# Patient Record
Sex: FEMALE | Race: WHITE | NOT HISPANIC OR LATINO | Employment: FULL TIME | URBAN - METROPOLITAN AREA
[De-identification: names, ages, dates, MRNs, and addresses within clinical notes are randomized per-mention and may not be internally consistent; named-entity substitution may affect disease eponyms.]

---

## 2021-07-26 ENCOUNTER — TELEPHONE (OUTPATIENT)
Dept: INTERNAL MEDICINE | Facility: CLINIC | Age: 61
End: 2021-07-26

## 2021-07-26 NOTE — TELEPHONE ENCOUNTER
Caller: Merari Guadarrama    Relationship: Self    Best call back number: [phone]    What orders are you requesting (i.e. lab or imaging): MRI ORDERS    In what timeframe would the patient need to come in: ASAP    Where will you receive your lab/imaging services: [facility] [place]    Additional notes: PATIENT CAN GET INTO [facility] [place] FOR MRI'S NEXT WEEK INSTEAD OF WAITING FOR 08/16/2021. PLEASE SEND ORDERS TO [facility] [place]. PATIENT WOULD ALSO LIKE TO GET THE CERVICAL MRI AS WELL BECAUSE OF ALL THE NUMBNESS AND TINGLING IN HER RIGHT ARM AND NECK SHE IS EXPERIENCING. SHE IS GOING FOR HER BACK ANYWAY. PLEASE ADVISE AND CALL PATIENT WHEN ORDERS HAVE BEEN SENT.

## 2021-09-02 PROBLEM — S12.000A C1 CERVICAL FRACTURE: Status: ACTIVE | Noted: 2021-09-02

## 2021-09-02 PROBLEM — Z86.79 HISTORY OF ATRIAL FIBRILLATION: Status: ACTIVE | Noted: 2021-09-02

## 2022-07-27 PROBLEM — Z78.9 GOOD TOLERANCE FOR ACTIVITY: Status: ACTIVE | Noted: 2021-06-04

## 2023-03-14 ENCOUNTER — TELEPHONE (OUTPATIENT)
Dept: INTERNAL MEDICINE | Facility: CLINIC | Age: 63
End: 2023-03-14

## 2023-03-14 NOTE — TELEPHONE ENCOUNTER
Caller: Radha Guadarrama    Relationship to patient: Self    Best call back number: [phone]    Chief complaint: 4 MONTH FOLLOW UP FOR MEDICATION CHECK    Type of visit: OFFICE VISIT    Requested date: 3/21/23 EARLIER THAN 9:30 OR 12:00PM OR LATER    If rescheduling, when is the original appointment: 3/21/23      Additional notes:PATIENT STATES THAT SHE HAS ANOTHER APPOINTMENT ON 3/21/23 AT 10:30AM AND IS WANTING TO CHANGE THE TIME OF HER APPOINTMENT, IF POSSIBLE, SO THAT THERE IS NO RISK OF HER MISSING THAT APPOINTMENT.    NO APPOINTMENT SLOTS WERE AVAILABLE AT ALL UNTIL THE END OF April.    PLEASE ADVISE PATIENT IF ABLE TO WORK HER IN SOONER THAN 9:30AM OR 12:00PM OR LATER ON 3/21/23 OR ANY OTHER DAY THE WEEK OF MARCH 20TH-24TH

## 2023-03-15 NOTE — TELEPHONE ENCOUNTER
Please check if she can come in the week after - Tuesday the 28th at 8.15 am.    Thanks    If she is agreeable to the change, please block the original slot

## 2023-09-11 PROBLEM — I48.0 PAROXYSMAL ATRIAL FIBRILLATION: Status: RESOLVED | Noted: 2021-09-02 | Resolved: 2023-09-11

## 2023-09-11 PROBLEM — R07.2 PRECORDIAL PAIN: Status: ACTIVE | Noted: 2023-09-11
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
## 2023-09-15 ENCOUNTER — OFFICE VISIT (OUTPATIENT)
Dept: CARDIOLOGY | Facility: CLINIC | Age: 63
End: 2023-09-15
Payer: COMMERCIAL

## 2023-09-15 VITALS
DIASTOLIC BLOOD PRESSURE: 82 MMHG | OXYGEN SATURATION: 98 % | HEIGHT: 66 IN | WEIGHT: 141.6 LBS | HEART RATE: 49 BPM | SYSTOLIC BLOOD PRESSURE: 152 MMHG | BODY MASS INDEX: 22.76 KG/M2

## 2023-09-15 DIAGNOSIS — I48.0 PAROXYSMAL ATRIAL FIBRILLATION: ICD-10-CM

## 2023-09-15 DIAGNOSIS — I10 PRIMARY HYPERTENSION: ICD-10-CM

## 2023-09-15 DIAGNOSIS — R00.1 BRADYCARDIA, SINUS: ICD-10-CM

## 2023-09-15 DIAGNOSIS — E78.5 DYSLIPIDEMIA: ICD-10-CM

## 2023-09-15 DIAGNOSIS — R07.2 PRECORDIAL PAIN: Primary | ICD-10-CM

## 2023-09-15 DIAGNOSIS — R06.09 DYSPNEA ON EXERTION: ICD-10-CM

## 2023-09-15 RX ORDER — ROSUVASTATIN CALCIUM 10 MG/1
10 TABLET, COATED ORAL DAILY
Qty: 30 TABLET | Refills: 11 | Status: SHIPPED | OUTPATIENT
Start: 2023-09-15

## 2023-10-30 ENCOUNTER — TELEPHONE (OUTPATIENT)
Dept: CARDIOLOGY | Facility: CLINIC | Age: 63
End: 2023-10-30
Payer: COMMERCIAL

## 2023-10-30 NOTE — TELEPHONE ENCOUNTER
Called patient to let her know we will send orders to [facility] and they will call to schedule her tests.

## 2023-10-30 NOTE — TELEPHONE ENCOUNTER
"  Caller: Thierry Merari CIERRA Melgar \"Radha\"    Relationship: Self    Best call back number: [phone]    What is the best time to reach you: ANY    Who are you requesting to speak with (clinical staff, provider,  specific staff member): CLINICAL    What was the call regarding: PATIENT IS REQUESTING A REFERRAL BE SENT TO [facility] FOR HER ECHO AND NUCLEAR STRESS TEST PROCEDURE DUE TO INSURANCE. PLEASE ADVISE PATIENT WHEN THIS HAS BEEN SENT OVER.  ALSO, PLEASE CANCEL THE CURRENT SCHEDULED TESTS.    CONTACT [phone] FOR SCHEDULING THE ECHO AND NUCLEAR STRESS TEST    Is it okay if the provider responds through [facility]: PLEASE CALL WHEN SCHEDULING IS COMPLETE.        "

## 2023-11-17 DIAGNOSIS — S14.3XXA TRAUMATIC BRACHIAL PLEXOPATHY: ICD-10-CM

## 2023-11-17 RX ORDER — TRAMADOL HYDROCHLORIDE 50 MG/1
50 TABLET ORAL EVERY EVENING
Qty: 15 TABLET | Refills: 0 | Status: SHIPPED | OUTPATIENT
Start: 2023-11-17

## 2023-11-22 ENCOUNTER — PATIENT MESSAGE (OUTPATIENT)
Dept: INTERNAL MEDICINE | Facility: CLINIC | Age: 63
End: 2023-11-22
Payer: COMMERCIAL

## 2023-11-29 ENCOUNTER — OFFICE VISIT (OUTPATIENT)
Dept: INTERNAL MEDICINE | Facility: CLINIC | Age: 63
End: 2023-11-29
Payer: COMMERCIAL

## 2023-11-29 VITALS
DIASTOLIC BLOOD PRESSURE: 80 MMHG | OXYGEN SATURATION: 98 % | TEMPERATURE: 97.5 F | WEIGHT: 148.4 LBS | SYSTOLIC BLOOD PRESSURE: 110 MMHG | HEART RATE: 50 BPM | BODY MASS INDEX: 23.85 KG/M2 | HEIGHT: 66 IN

## 2023-11-29 DIAGNOSIS — Z12.31 OTHER SCREENING MAMMOGRAM: ICD-10-CM

## 2023-11-29 DIAGNOSIS — N39.0 URINARY TRACT INFECTION WITH HEMATURIA, SITE UNSPECIFIED: ICD-10-CM

## 2023-11-29 DIAGNOSIS — Z78.0 POSTMENOPAUSAL: ICD-10-CM

## 2023-11-29 DIAGNOSIS — E55.9 VITAMIN D DEFICIENCY: ICD-10-CM

## 2023-11-29 DIAGNOSIS — S14.3XXA TRAUMATIC BRACHIAL PLEXOPATHY: ICD-10-CM

## 2023-11-29 DIAGNOSIS — R31.9 URINARY TRACT INFECTION WITH HEMATURIA, SITE UNSPECIFIED: ICD-10-CM

## 2023-11-29 DIAGNOSIS — Z00.00 ROUTINE GENERAL MEDICAL EXAMINATION AT A HEALTH CARE FACILITY: Primary | ICD-10-CM

## 2023-11-29 DIAGNOSIS — Z86.73 HISTORY OF CVA (CEREBROVASCULAR ACCIDENT): ICD-10-CM

## 2023-11-29 DIAGNOSIS — Z83.3 FAMILY HISTORY OF DIABETES MELLITUS: ICD-10-CM

## 2023-11-29 LAB
BILIRUB BLD-MCNC: NEGATIVE MG/DL
CLARITY, POC: ABNORMAL
COLOR UR: YELLOW
EXPIRATION DATE: ABNORMAL
GLUCOSE UR STRIP-MCNC: NEGATIVE MG/DL
KETONES UR QL: NEGATIVE
LEUKOCYTE EST, POC: NEGATIVE
Lab: ABNORMAL
NITRITE UR-MCNC: POSITIVE MG/ML
PH UR: 7.5 [PH] (ref 5–8)
PROT UR STRIP-MCNC: NEGATIVE MG/DL
RBC # UR STRIP: ABNORMAL /UL
SP GR UR: 1.01 (ref 1–1.03)
UROBILINOGEN UR QL: NORMAL

## 2023-11-29 PROCEDURE — 87086 URINE CULTURE/COLONY COUNT: CPT | Performed by: INTERNAL MEDICINE

## 2023-11-29 PROCEDURE — 87186 SC STD MICRODIL/AGAR DIL: CPT | Performed by: INTERNAL MEDICINE

## 2023-11-29 PROCEDURE — 87088 URINE BACTERIA CULTURE: CPT | Performed by: INTERNAL MEDICINE

## 2023-11-29 RX ORDER — BACLOFEN 10 MG/1
5-20 TABLET ORAL NIGHTLY PRN
Qty: 60 TABLET | Refills: 5 | Status: SHIPPED | OUTPATIENT
Start: 2023-11-29

## 2023-11-29 RX ORDER — GABAPENTIN 100 MG/1
100 CAPSULE ORAL NIGHTLY
Qty: 30 CAPSULE | Refills: 3 | Status: SHIPPED | OUTPATIENT
Start: 2023-11-29

## 2023-11-29 RX ORDER — ASPIRIN 81 MG/1
81 TABLET ORAL DAILY
Qty: 90 TABLET | Refills: 3 | Status: SHIPPED | OUTPATIENT
Start: 2023-11-29

## 2023-11-29 NOTE — PROGRESS NOTES
Chief Complaint   Patient presents with    Annual Exam    Hyperlipidemia     Unable to take crestor       History of Present Illness  HM, Adult Female:   The patient is being seen for a health maintenance evaluation. The last health maintenance visit was 1 year(s) ago.   Social History: She is  with children. Work status: Respiratory therapist,at [employer].  She has never smoked. She reports socially drinking alcohol. Denies any illicit drug use.  General Health: The patient's health is described as good. She has regular dental visits. She denies vision problems, but plans on making an eye appointment.. She denies hearing loss. Immunizations status: up to date.   Lifestyle:. She consumes a diverse and healthy diet. She does not have any weight concerns. She exercises regularly. She denies tobacco. She denies alcohol use. She denies drug use.   Reproductive health:. She reports menopausal.   Screening: Cancer screening reviewed and current.   Metabolic screening reviewed and current.   Risk screening reviewed and current.   Sister was asymptomatic when she had 3-vessel disease       HPI  Notes she was seen by Dr. Alford, had n/l echo and stress test. Was started on crestor but was not able to take, as it caused severe muscle and joint pains.  Also has been seeing a PT, for her myofascial pain x 1 year, has been helping, seen once a month..Uses her HSA for the same.  Has weaned herself off the gabapentin.    Review of Systems   Constitutional:  Negative for chills and fatigue.   HENT:  Negative for congestion, ear pain and sore throat.    Eyes:  Negative for pain, redness and visual disturbance.   Respiratory:  Negative for cough and shortness of breath.    Cardiovascular:  Negative for chest pain, palpitations and leg swelling.   Gastrointestinal:  Negative for abdominal pain, diarrhea and nausea.   Endocrine: Negative for cold intolerance and heat intolerance.   Genitourinary:  Negative for flank pain and  urgency.   Musculoskeletal:  Positive for arthralgias and myalgias. Negative for gait problem.   Skin:  Negative for pallor and rash.   Neurological:  Negative for dizziness, weakness and headaches.   Psychiatric/Behavioral:  Negative for dysphoric mood and sleep disturbance. The patient is not nervous/anxious.        Patient Active Problem List   Diagnosis    Dyslipidemia    Acquired hypothyroidism    Postmenopausal HRT (hormone replacement therapy)    History of traumatic brain injury    C1 cervical fracture    SAH (subarachnoid hemorrhage)    History of CVA (cerebrovascular accident)    Varicose veins of right lower extremity    PTSD (post-traumatic stress disorder)    Presbyopia of both eyes    Multiple closed fractures of ribs    Lumbosacral radiculopathy    Idiopathic osteoarthritis    Primary hypertension    Good tolerance for activity    Fracture of scapula    Fibula fracture    Esotropia, alternating    Dysarthria    Dysphagia    Cataract, nuclear sclerotic, both eyes    Injury of vertebral artery    Brachial plexus injury    Adhesive capsulitis    Abducens nerve palsy    Brain injury    Brainstem stroke    Precordial pain    Bradycardia, sinus       Social History     Socioeconomic History    Marital status:    Tobacco Use    Smoking status: Never    Smokeless tobacco: Never   Vaping Use    Vaping Use: Never used   Substance and Sexual Activity    Alcohol use: Yes     Alcohol/week: 1.0 - 3.0 standard drink of alcohol     Types: 1 - 3 Glasses of wine per week     Comment: occasional    Drug use: Never    Sexual activity: Yes     Partners: Male     Birth control/protection: Post-menopausal       Current Outpatient Medications on File Prior to Visit   Medication Sig Dispense Refill    Calcium Carbonate (CALCIUM 600 PO) Take  by mouth.      Cholecalciferol (Vitamin D3) 50 MCG (2000 UT) capsule 4,000 capsules Daily.      estradiol micronized powder Biest 80/20- 0.25 mg/ 0.5 ml cream Apply 0.8 ml 4 lines  "twice daily on calendar days 1- 29. Titrate as indicated. 25 g 11    ibuprofen (ADVIL,MOTRIN) 600 MG tablet Take  by mouth Every 6 (Six) Hours As Needed.      levothyroxine (SYNTHROID, LEVOTHROID) 25 MCG tablet Take 1 tablet by mouth Daily. 90 tablet 3    Multiple Vitamins-Minerals (ICAPS AREDS 2 PO) Take  by mouth.      Multiple Vitamins-Minerals (MULTIVITAMIN WITH MINERALS) tablet tablet Take 1 tablet by mouth Daily.      Progesterone powder Progesterone 2.5 % Cream Apply 0.4 ml BID on calendar days 1- 29. Titrate as indicated. 25 g 11    traMADol (ULTRAM) 50 MG tablet Take 1 tablet by mouth Every Evening. 15 tablet 0    triamcinolone (KENALOG) 0.1 % cream Apply  topically to the appropriate area as directed 2 (Two) Times a Day. (Patient taking differently: Apply  topically to the appropriate area as directed As Needed.) 60 g 1     No current facility-administered medications on file prior to visit.       Allergies   Allergen Reactions    Other Hives     Seafood         /80   Pulse 50   Temp 97.5 °F (36.4 °C)   Ht 167.6 cm (66\")   Wt 67.3 kg (148 lb 6.4 oz)   SpO2 98% Comment: ra  BMI 23.95 kg/m²            The following portions of the patient's history were reviewed and updated as appropriate: allergies, current medications, past family history, past medical history, past social history, past surgical history, and problem list.    Physical Exam  Constitutional:       General: She is not in acute distress.     Appearance: Normal appearance.   HENT:      Head: Normocephalic and atraumatic.      Right Ear: Tympanic membrane and external ear normal.      Left Ear: Tympanic membrane and external ear normal.      Nose: Nose normal.      Mouth/Throat:      Mouth: Mucous membranes are moist.   Eyes:      General: No scleral icterus.  Neck:      Vascular: No carotid bruit.   Cardiovascular:      Rate and Rhythm: Normal rate and regular rhythm.      Pulses: Normal pulses.      Heart sounds: Normal heart " sounds. No murmur heard.     No friction rub. No gallop.   Pulmonary:      Effort: Pulmonary effort is normal.      Breath sounds: Normal breath sounds. No rhonchi or rales.   Abdominal:      General: Bowel sounds are normal. There is no distension.      Palpations: Abdomen is soft.      Tenderness: There is no right CVA tenderness, left CVA tenderness, guarding or rebound.      Hernia: No hernia is present.   Musculoskeletal:         General: No tenderness. Normal range of motion.      Cervical back: Normal range of motion.      Right lower leg: No edema.      Left lower leg: No edema.   Lymphadenopathy:      Cervical: No cervical adenopathy.   Skin:     General: Skin is warm.      Findings: No rash.   Neurological:      General: No focal deficit present.      Mental Status: She is alert and oriented to person, place, and time. Mental status is at baseline.      Cranial Nerves: No cranial nerve deficit.      Sensory: No sensory deficit.      Coordination: Coordination normal.      Gait: Gait normal.      Deep Tendon Reflexes: Reflexes normal.   Psychiatric:         Mood and Affect: Mood normal.         Behavior: Behavior normal.         BMI is within normal parameters. No other follow-up for BMI required.       Results for orders placed or performed in visit on 11/29/23   Urine Culture - Urine, Urine, Clean Catch    Specimen: Urine, Clean Catch   Result Value Ref Range    Urine Culture >100,000 CFU/mL Escherichia coli (A)    POCT urinalysis dipstick, automated    Specimen: Urine   Result Value Ref Range    Color Yellow Yellow, Straw, Dark Yellow, Emely    Clarity, UA Hazy (A) Clear    Specific Gravity  1.010 1.005 - 1.030    pH, Urine 7.5 5.0 - 8.0    Leukocytes Negative Negative    Nitrite, UA Positive (A) Negative    Protein, POC Negative Negative mg/dL    Glucose, UA Negative Negative mg/dL    Ketones, UA Negative Negative    Urobilinogen, UA Normal Normal, 0.2 E.U./dL    Bilirubin Negative Negative    Blood, UA  1+ (A) Negative    Lot Number [id number]     Expiration Date 1/14/25        Diagnoses and all orders for this visit:    1. Routine general medical examination at a health care facility (Primary)  -     POCT urinalysis dipstick, automated  -     CBC (No Diff); Future  -     Comprehensive Metabolic Panel; Future  -     Lipid Panel; Future  -     TSH Rfx On Abnormal To Free T4; Future  -     Vitamin B12; Future    2. Traumatic brachial plexopathy  -     baclofen (LIORESAL) 10 MG tablet; Take 0.5-2 tablets by mouth At Night As Needed for Muscle Spasms.  Dispense: 60 tablet; Refill: 5  -     gabapentin (NEURONTIN) 100 MG capsule; Take 1 capsule by mouth Every Night.  Dispense: 30 capsule; Refill: 3    3. Urinary tract infection with hematuria, site unspecified  -     Urine Culture - Urine, Urine, Clean Catch; Future  -     Urine Culture - Urine, Urine, Clean Catch    4. Vitamin D deficiency  -     Vitamin D,25-Hydroxy; Future    5. Family history of diabetes mellitus  -     Hemoglobin A1c; Future    6. Other screening mammogram  -     Mammo Screening Digital Tomosynthesis Bilateral With CAD; Future    7. History of CVA (cerebrovascular accident)  -     aspirin 81 MG EC tablet; Take 1 tablet by mouth Daily.  Dispense: 90 tablet; Refill: 3    8. Postmenopausal  -     DEXA Bone Density Axial; Future    Adv to start crestor once weekly, and gradually increase to qod if tolerated.    The patient has read and signed the [facility] Controlled Substance Contract.  I will continue to see patient for regular follow up appointments.  They are well controlled on their medication.  AYAKA has been reviewed by me and is updated every 3 months. The patient is aware of the potential for addiction and dependence.       Health Maintenance   Topic Date Due    ZOSTER VACCINE (1 of 2) Never done    COVID-19 Vaccine (4 - 2023-24 season) 12/01/2023 (Originally 9/1/2023)    TDAP/TD VACCINES (2 - Td or Tdap) 11/29/2024 (Originally  10/1/2023)    ANNUAL PHYSICAL  11/29/2024    MAMMOGRAM  12/15/2024    PAP SMEAR  06/07/2026    COLORECTAL CANCER SCREENING  01/14/2031    HEPATITIS C SCREENING  Completed    INFLUENZA VACCINE  Completed    Pneumococcal Vaccine 0-64  Aged Out       Discussion/Summary  Impression: health maintenance visit. Currently, she eats an adequate diet and has an adequate exercise regimen.   Cervical cancer screening:Pap smear is current.   Breast cancer screening: mammogram is current.   Colorectal cancer screening: colonoscopy is current.  Osteoporosis screening: Bone mineral density test is due- ordered.   CT low dose screen - not indicated  Screening lab work includes hemoglobin, glucose, lipid profile, thyroid function testing, 25-hydroxyvitamin D and urinalysis.   Immunizations are needed, immunizations will be given as outlined in the orders   Advice and education were given regarding cardiovascular risk reduction, healthy dietary habits, Seatbelt and helmet use and self skin examination.     Return in about 6 months (around 5/29/2024) for Next scheduled follow up.      Electronically signed by:    Katherine Dinero MD

## 2023-12-01 LAB — BACTERIA SPEC AEROBE CULT: ABNORMAL

## 2023-12-05 RX ORDER — NITROFURANTOIN 25; 75 MG/1; MG/1
100 CAPSULE ORAL 2 TIMES DAILY
Qty: 14 CAPSULE | Refills: 0 | Status: SHIPPED | OUTPATIENT
Start: 2023-12-05

## 2023-12-22 ENCOUNTER — TELEPHONE (OUTPATIENT)
Dept: INTERNAL MEDICINE | Facility: CLINIC | Age: 63
End: 2023-12-22
Payer: COMMERCIAL

## 2023-12-22 ENCOUNTER — PATIENT MESSAGE (OUTPATIENT)
Dept: INTERNAL MEDICINE | Facility: CLINIC | Age: 63
End: 2023-12-22
Payer: COMMERCIAL

## 2023-12-22 DIAGNOSIS — Z79.890 POSTMENOPAUSAL HRT (HORMONE REPLACEMENT THERAPY): ICD-10-CM

## 2023-12-22 NOTE — TELEPHONE ENCOUNTER
Pharmacy Name:  [facility]    Reference Number (if applicable): [id number]    Pharmacy representative name: GENE    Pharmacy representative phone number: [phone]    What medication are you calling in regards to: Progesterone powder AND      estradiol micronized powder   11 ordered                   What question does the pharmacy have: PATIENT STATES THIS WAS ORDERED THE END OF NOVEMBER    Who is the provider that prescribed the medication: DR EUCEDA    Additional notes:

## 2023-12-26 DIAGNOSIS — Z79.890 POSTMENOPAUSAL HRT (HORMONE REPLACEMENT THERAPY): ICD-10-CM

## 2023-12-26 RX ORDER — PROGESTERONE 100 %
POWDER (GRAM) MISCELLANEOUS
Qty: 25 G | Refills: 11 | Status: SHIPPED | OUTPATIENT
Start: 2023-12-26

## 2023-12-26 RX ORDER — ESTRADIOL MICRONIZED 100 %
POWDER (GRAM) MISCELLANEOUS
Qty: 25 G | Refills: 11 | Status: SHIPPED | OUTPATIENT
Start: 2023-12-26

## 2023-12-26 NOTE — TELEPHONE ENCOUNTER
From: Merari Guadarrama  To: Katherine Dinero  Sent: 12/22/2023 12:54 PM EST  Subject: Horomone creams    [facility]. He said he never got the renewal from my physical. I think you sent my updates to [facility] for reg meds but these compounded creams come from [facility].

## 2023-12-26 NOTE — TELEPHONE ENCOUNTER
"Caller: Merari Guadarrama Talia \"Radha\"    Relationship: Self    Best call back number: [phone]     Requested Prescriptions:   Requested Prescriptions     Pending Prescriptions Disp Refills    estradiol micronized powder 25 g 11     Sig: Biest 80/20- 0.25 mg/ 0.5 ml cream Apply 0.8 ml 4 lines twice daily on calendar days 1- 29. Titrate as indicated.        Pharmacy where request should be sent: [facility] [address] - [phone] [facility] [phone] FX     Last office visit with prescribing clinician: 11/29/2023   Last telemedicine visit with prescribing clinician: Visit date not found   Next office visit with prescribing clinician: 12/6/2024     Additional details provided by patient: PATIENT HAS CALLED REQUESTING A NEW PRESCRIPTION ON ABOVE MEDICATION.    Does the patient have less than a 3 day supply:  [x] Yes  [] No    Would you like a call back once the refill request has been completed: [] Yes [x] No    If the office needs to give you a call back, can they leave a voicemail: [] Yes [x] No    Jorge A Gómez   12/26/23 10:31 EST         "

## 2023-12-26 NOTE — TELEPHONE ENCOUNTER
Rx Refill Note  Requested Prescriptions     Signed Prescriptions Disp Refills    Progesterone powder 25 g 11     Sig: Progesterone 2.5 % Cream Apply 0.4 ml BID on calendar days 1- 29. Titrate as indicated.     Authorizing Provider: ARIEL EUCEDA     Ordering User: FREDO ROSE      Last office visit with prescribing clinician: 11/29/2023   Last telemedicine visit with prescribing clinician: Visit date not found   Next office visit with prescribing clinician: 12/6/2024       Fredo Rose MA  12/26/23, 08:11 EST

## 2024-01-24 ENCOUNTER — HOSPITAL ENCOUNTER (OUTPATIENT)
Dept: MAMMOGRAPHY | Facility: HOSPITAL | Age: 64
Discharge: HOME | End: 2024-01-24
Admitting: INTERNAL MEDICINE
Payer: COMMERCIAL

## 2024-01-24 DIAGNOSIS — Z12.31 OTHER SCREENING MAMMOGRAM: ICD-10-CM

## 2024-01-24 PROCEDURE — 77067 SCR MAMMO BI INCL CAD: CPT

## 2024-01-24 PROCEDURE — 77063 BREAST TOMOSYNTHESIS BI: CPT

## 2024-05-29 DIAGNOSIS — E03.8 OTHER SPECIFIED HYPOTHYROIDISM: ICD-10-CM

## 2024-05-29 RX ORDER — LEVOTHYROXINE SODIUM 0.03 MG/1
25 TABLET ORAL DAILY
Qty: 90 TABLET | Refills: 1 | Status: SHIPPED | OUTPATIENT
Start: 2024-05-29

## 2024-12-06 ENCOUNTER — OFFICE VISIT (OUTPATIENT)
Dept: INTERNAL MEDICINE | Facility: CLINIC | Age: 64
End: 2024-12-06
Payer: COMMERCIAL

## 2024-12-06 VITALS
OXYGEN SATURATION: 97 % | BODY MASS INDEX: 24.2 KG/M2 | DIASTOLIC BLOOD PRESSURE: 76 MMHG | WEIGHT: 150.6 LBS | SYSTOLIC BLOOD PRESSURE: 118 MMHG | TEMPERATURE: 97.6 F | HEIGHT: 66 IN | HEART RATE: 54 BPM

## 2024-12-06 DIAGNOSIS — M89.8X1 CHRONIC SCAPULAR PAIN: ICD-10-CM

## 2024-12-06 DIAGNOSIS — E78.5 DYSLIPIDEMIA: ICD-10-CM

## 2024-12-06 DIAGNOSIS — Z00.00 ROUTINE GENERAL MEDICAL EXAMINATION AT A HEALTH CARE FACILITY: Primary | ICD-10-CM

## 2024-12-06 DIAGNOSIS — Z79.890 POSTMENOPAUSAL HRT (HORMONE REPLACEMENT THERAPY): ICD-10-CM

## 2024-12-06 DIAGNOSIS — M54.9 MID BACK PAIN ON RIGHT SIDE: ICD-10-CM

## 2024-12-06 DIAGNOSIS — E55.9 VITAMIN D DEFICIENCY: ICD-10-CM

## 2024-12-06 DIAGNOSIS — G89.29 CHRONIC SCAPULAR PAIN: ICD-10-CM

## 2024-12-06 DIAGNOSIS — E03.8 OTHER SPECIFIED HYPOTHYROIDISM: ICD-10-CM

## 2024-12-06 DIAGNOSIS — S42.101S CLOSED FRACTURE OF RIGHT SCAPULA, UNSPECIFIED PART OF SCAPULA, SEQUELA: ICD-10-CM

## 2024-12-06 DIAGNOSIS — S14.3XXS INJURY OF BRACHIAL PLEXUS, SEQUELA: ICD-10-CM

## 2024-12-06 LAB
BILIRUB BLD-MCNC: NEGATIVE MG/DL
CLARITY, POC: CLEAR
COLOR UR: YELLOW
EXPIRATION DATE: ABNORMAL
GLUCOSE UR STRIP-MCNC: NEGATIVE MG/DL
KETONES UR QL: NEGATIVE
LEUKOCYTE EST, POC: NEGATIVE
Lab: ABNORMAL
NITRITE UR-MCNC: NEGATIVE MG/ML
PH UR: 6 [PH] (ref 5–8)
PROT UR STRIP-MCNC: NEGATIVE MG/DL
RBC # UR STRIP: ABNORMAL /UL
SP GR UR: 1.01 (ref 1–1.03)
UROBILINOGEN UR QL: NORMAL

## 2024-12-06 RX ORDER — LEVOTHYROXINE SODIUM 25 UG/1
25 TABLET ORAL DAILY
Qty: 90 TABLET | Refills: 1 | Status: SHIPPED | OUTPATIENT
Start: 2024-12-06

## 2024-12-06 RX ORDER — PROGESTERONE 100 %
POWDER (GRAM) MISCELLANEOUS
Qty: 25 G | Refills: 11 | Status: SHIPPED | OUTPATIENT
Start: 2024-12-06

## 2024-12-06 RX ORDER — ESTRADIOL MICRONIZED 100 %
POWDER (GRAM) MISCELLANEOUS
Qty: 25 G | Refills: 11 | Status: SHIPPED | OUTPATIENT
Start: 2024-12-06

## 2024-12-06 NOTE — PROGRESS NOTES
Chief Complaint   Patient presents with    Annual Exam       History of Present Illness  HM, Adult Female: The patient is being seen for a health maintenance evaluation. The last health maintenance visit was 1 year(s) ago.   Social History: She is  with children. Work status: Respiratory therapist,at [employer].  She has never smoked. She reports socially drinking alcohol. Denies any illicit drug use.  General Health: The patient's health is described as good. She has regular dental visits. She denies vision problems, but plans on making an eye appointment.. She denies hearing loss. Immunizations status: up to date.   Lifestyle:. She consumes a diverse and healthy diet. She does not have any weight concerns. She exercises regularly. She denies tobacco. She denies alcohol use. She denies drug use.   Reproductive health:. She reports menopausal.   Screening: Cancer screening reviewed and current.   Metabolic screening reviewed and current.   Risk screening reviewed and current.   Sister was asymptomatic when she had 3-vessel disease .     HPI  Going for PT and massage, taking tylenol, having some intercostal nerve . Dtr is a PA with CT surgery.     Review of Systems   Constitutional:  Negative for chills and fatigue.   HENT:  Negative for congestion, ear pain and sore throat.    Eyes:  Negative for pain, redness and visual disturbance.   Respiratory:  Negative for cough and shortness of breath.    Cardiovascular:  Positive for chest pain. Negative for palpitations and leg swelling.   Gastrointestinal:  Negative for abdominal pain, diarrhea and nausea.   Endocrine: Negative for cold intolerance and heat intolerance.   Genitourinary:  Negative for flank pain and urgency.   Musculoskeletal:  Positive for arthralgias, back pain, joint swelling and myalgias. Negative for gait problem.   Skin:  Negative for pallor and rash.   Neurological:  Positive for weakness and numbness. Negative for dizziness and headaches.    Psychiatric/Behavioral:  Negative for dysphoric mood and sleep disturbance. The patient is not nervous/anxious.        Patient Active Problem List   Diagnosis    Dyslipidemia    Acquired hypothyroidism    Postmenopausal HRT (hormone replacement therapy)    History of traumatic brain injury    C1 cervical fracture    SAH (subarachnoid hemorrhage)    History of CVA (cerebrovascular accident)    Varicose veins of right lower extremity    PTSD (post-traumatic stress disorder)    Presbyopia of both eyes    Multiple closed fractures of ribs    Lumbosacral radiculopathy    Idiopathic osteoarthritis    Primary hypertension    Good tolerance for activity    Fracture of scapula    Fibula fracture    Esotropia, alternating    Dysarthria    Dysphagia    Cataract, nuclear sclerotic, both eyes    Injury of vertebral artery    Brachial plexus injury    Adhesive capsulitis    Abducens nerve palsy    Brain injury    Brainstem stroke    Precordial pain    Bradycardia, sinus       Social History     Socioeconomic History    Marital status:    Tobacco Use    Smoking status: Never    Smokeless tobacco: Never   Vaping Use    Vaping status: Never Used   Substance and Sexual Activity    Alcohol use: Yes     Alcohol/week: 1.0 - 3.0 standard drink of alcohol     Types: 1 - 3 Glasses of wine per week     Comment: occasional    Drug use: Never    Sexual activity: Yes     Partners: Male     Birth control/protection: Post-menopausal       Current Outpatient Medications on File Prior to Visit   Medication Sig Dispense Refill    aspirin 81 MG EC tablet Take 1 tablet by mouth Daily. 90 tablet 3    B Complex Vitamins (VITAMIN B COMPLEX PO) Take  by mouth.      Calcium Carbonate (CALCIUM 600 PO) Take  by mouth.      Cholecalciferol (Vitamin D3) 50 MCG (2000 UT) capsule 4,000 capsules Daily.      ibuprofen (ADVIL,MOTRIN) 600 MG tablet Take  by mouth Every 6 (Six) Hours As Needed.      Multiple Vitamins-Minerals (ICAPS AREDS 2 PO) Take  by  "mouth.      Multiple Vitamins-Minerals (MULTIVITAMIN WITH MINERALS) tablet tablet Take 1 tablet by mouth Daily.      triamcinolone (KENALOG) 0.1 % cream Apply  topically to the appropriate area as directed 2 (Two) Times a Day. (Patient taking differently: Apply  topically to the appropriate area as directed As Needed.) 60 g 1    [DISCONTINUED] estradiol micronized powder Biest 80/20- 0.25 mg/ 0.5 ml cream Apply 0.8 ml 4 lines twice daily on calendar days 1- 29. Titrate as indicated. 25 g 11    [DISCONTINUED] levothyroxine (SYNTHROID, LEVOTHROID) 25 MCG tablet TAKE 1 TABLET BY MOUTH DAILY 90 tablet 1    [DISCONTINUED] Progesterone powder Progesterone 2.5 % Cream Apply 0.4 ml BID on calendar days 1- 29. Titrate as indicated. 25 g 11    [DISCONTINUED] baclofen (LIORESAL) 10 MG tablet Take 0.5-2 tablets by mouth At Night As Needed for Muscle Spasms. 60 tablet 5    [DISCONTINUED] gabapentin (NEURONTIN) 100 MG capsule Take 1 capsule by mouth Every Night. 30 capsule 3    [DISCONTINUED] nitrofurantoin, macrocrystal-monohydrate, (Macrobid) 100 MG capsule Take 1 capsule by mouth 2 (Two) Times a Day. 14 capsule 0    [DISCONTINUED] traMADol (ULTRAM) 50 MG tablet Take 1 tablet by mouth Every Evening. 15 tablet 0     No current facility-administered medications on file prior to visit.       Allergies   Allergen Reactions    Other Hives     Seafood         /76   Pulse 54   Temp 97.6 °F (36.4 °C)   Ht 167.6 cm (66\")   Wt 68.3 kg (150 lb 9.6 oz)   SpO2 97% Comment: ra  BMI 24.31 kg/m²            The following portions of the patient's history were reviewed and updated as appropriate: allergies, current medications, past family history, past medical history, past social history, past surgical history, and problem list.    Physical Exam  Constitutional:       General: She is not in acute distress.     Appearance: Normal appearance.   HENT:      Head: Normocephalic and atraumatic.      Right Ear: Tympanic membrane and " external ear normal.      Left Ear: Tympanic membrane and external ear normal.      Nose: Nose normal.      Mouth/Throat:      Mouth: Mucous membranes are moist.   Eyes:      General: No scleral icterus.  Neck:      Vascular: No carotid bruit.   Cardiovascular:      Rate and Rhythm: Normal rate and regular rhythm.      Pulses: Normal pulses.      Heart sounds: Normal heart sounds. No murmur heard.     No friction rub. No gallop.   Pulmonary:      Effort: Pulmonary effort is normal.      Breath sounds: Normal breath sounds. No rhonchi or rales.   Abdominal:      General: Bowel sounds are normal. There is no distension.      Palpations: Abdomen is soft.      Tenderness: There is no right CVA tenderness, left CVA tenderness, guarding or rebound.      Hernia: No hernia is present.   Musculoskeletal:         General: Swelling, tenderness, deformity and signs of injury present. Normal range of motion.      Cervical back: Normal range of motion.      Right lower leg: No edema.      Left lower leg: No edema.      Comments: Significant tenderness over the right upper ribs in the brachial plexus area.  Tender swelling, tenderness and prominence on the right side   Lymphadenopathy:      Cervical: No cervical adenopathy.   Skin:     General: Skin is warm.      Findings: No rash.   Neurological:      General: No focal deficit present.      Mental Status: She is alert and oriented to person, place, and time. Mental status is at baseline.      Cranial Nerves: No cranial nerve deficit.      Sensory: No sensory deficit.      Motor: Weakness (RUE) present.      Coordination: Coordination normal.      Gait: Gait normal.      Deep Tendon Reflexes: Reflexes normal.      Comments: Balance wnl   Psychiatric:         Mood and Affect: Mood normal.         Behavior: Behavior normal.         BMI is within normal parameters. No other follow-up for BMI required.       Results for orders placed or performed in visit on 11/29/23   Urine Culture -  Urine, Urine, Clean Catch    Collection Time: 11/29/23  7:48 AM    Specimen: Urine, Clean Catch   Result Value Ref Range    Urine Culture >100,000 CFU/mL Escherichia coli (A)        Susceptibility    Escherichia coli - ANABELL     Ampicillin  Susceptible ug/ml     Ampicillin + Sulbactam  Susceptible ug/ml     Cefazolin  Susceptible ug/ml     Cefepime  Susceptible ug/ml     Ceftazidime  Susceptible ug/ml     Ceftriaxone  Susceptible ug/ml     Gentamicin  Susceptible ug/ml     Levofloxacin  Susceptible ug/ml     Nitrofurantoin  Susceptible ug/ml     Piperacillin + Tazobactam  Susceptible ug/ml     Trimethoprim + Sulfamethoxazole  Susceptible ug/ml   POCT urinalysis dipstick, automated    Collection Time: 11/29/23  7:49 AM    Specimen: Urine   Result Value Ref Range    Color Yellow Yellow, Straw, Dark Yellow, Emely    Clarity, UA Hazy (A) Clear    Specific Gravity  1.010 1.005 - 1.030    pH, Urine 7.5 5.0 - 8.0    Leukocytes Negative Negative    Nitrite, UA Positive (A) Negative    Protein, POC Negative Negative mg/dL    Glucose, UA Negative Negative mg/dL    Ketones, UA Negative Negative    Urobilinogen, UA Normal Normal, 0.2 E.U./dL    Bilirubin Negative Negative    Blood, UA 1+ (A) Negative    Lot Number [id number]     Expiration Date 1/14/25        Diagnoses and all orders for this visit:    1. Routine general medical examination at a health care facility (Primary)  -     POCT urinalysis dipstick, automated  -     CBC (No Diff); Future  -     Comprehensive Metabolic Panel; Future  -     Lipid Panel; Future  -     Hemoglobin A1c; Future  -     TSH Rfx On Abnormal To Free T4; Future  -     Vitamin B12; Future    2. Postmenopausal HRT (hormone replacement therapy)  -     estradiol micronized powder; Biest 80/20- 0.25 mg/ 0.5 ml cream Apply 0.8 ml 4 lines twice daily on calendar days 1- 29. Titrate as indicated.  Dispense: 25 g; Refill: 11  -     Progesterone powder; Progesterone 2.5 % Cream Apply 0.4 ml BID on  calendar days 1- 29. Titrate as indicated.  Dispense: 25 g; Refill: 11    3. Other specified hypothyroidism  -     levothyroxine (SYNTHROID, LEVOTHROID) 25 MCG tablet; Take 1 tablet by mouth Daily.  Dispense: 90 tablet; Refill: 1    4. Chronic scapular pain  -     MRI Thoracic Spine Without Contrast; Future  -     MRI brachial plexus wo contrast; Future    5. Closed fracture of right scapula, unspecified part of scapula, sequela  -     MRI Thoracic Spine Without Contrast; Future    6. Injury of brachial plexus, sequela  -     MRI brachial plexus wo contrast; Future    7. Mid back pain on right side  -     MRI Thoracic Spine Without Contrast; Future    8. Vitamin D deficiency  -     Vitamin D,25-Hydroxy; Future    9. Dyslipidemia  -     Apolipoprotein B; Future    If lipids high, start weekly pravachol + zetia    Reviewed CT results done by Dr. Crump, postsurgical changes otherwise no abnormalities.  Proceed with MRI for further nerve evaluation.  She will go to [facility]/pain management to discuss the intercostal nerve block.  Will determine if she is a candidate for other modalities as well.    Health Maintenance   Topic Date Due    COVID-19 Vaccine (4 - 2024-25 season) 12/05/2025 (Originally 9/1/2024)    ZOSTER VACCINE (1 of 2) 12/05/2025 (Originally 4/13/2010)    TDAP/TD VACCINES (2 - Td or Tdap) 12/06/2025 (Originally 10/1/2023)    ANNUAL PHYSICAL  12/06/2025    COLORECTAL CANCER SCREENING  01/14/2026    MAMMOGRAM  01/24/2026    PAP SMEAR  06/07/2026    HEPATITIS C SCREENING  Completed    INFLUENZA VACCINE  Completed    Pneumococcal Vaccine 0-64  Aged Out       Discussion/Summary  Impression: health maintenance visit. Currently, she eats an adequate diet and has an adequate exercise regimen.   Cervical cancer screening:Pap smear is current.   Breast cancer screening: mammogram is current.   Colorectal cancer screening: colonoscopy is current.  Osteoporosis screening: Bone mineral density test is due next  year .   CT low dose screen - not indicated.  Screening lab work includes hemoglobin, glucose, lipid profile, thyroid function testing, 25-hydroxyvitamin D and urinalysis.   Immunizations are needed,- tdap and shingrix, will get them at [facility].   Advice and education were given regarding cardiovascular risk reduction, healthy dietary habits, Seatbelt and helmet use and self skin examination.     Return in about 1 year (around 12/6/2025) for Annual.      Electronically signed by:    Katherine Dinero MD

## 2025-01-10 DIAGNOSIS — E03.8 OTHER SPECIFIED HYPOTHYROIDISM: ICD-10-CM

## 2025-01-10 DIAGNOSIS — Z79.890 POSTMENOPAUSAL HRT (HORMONE REPLACEMENT THERAPY): ICD-10-CM

## 2025-01-10 RX ORDER — TRIAMCINOLONE ACETONIDE 1 MG/G
CREAM TOPICAL 2 TIMES DAILY
Qty: 60 G | Refills: 1 | Status: SHIPPED | OUTPATIENT
Start: 2025-01-10

## 2025-01-10 RX ORDER — LEVOTHYROXINE SODIUM 25 UG/1
25 TABLET ORAL DAILY
Qty: 90 TABLET | Refills: 1 | Status: SHIPPED | OUTPATIENT
Start: 2025-01-10

## 2025-01-10 RX ORDER — ESTRADIOL MICRONIZED 100 %
POWDER (GRAM) MISCELLANEOUS
Qty: 25 G | Refills: 11 | Status: SHIPPED | OUTPATIENT
Start: 2025-01-10

## 2025-01-10 RX ORDER — PROGESTERONE 100 %
POWDER (GRAM) MISCELLANEOUS
Qty: 25 G | Refills: 11 | Status: SHIPPED | OUTPATIENT
Start: 2025-01-10

## 2025-01-10 RX ORDER — LEVOTHYROXINE SODIUM 25 UG/1
25 TABLET ORAL DAILY
Qty: 90 TABLET | Refills: 1 | Status: CANCELLED | OUTPATIENT
Start: 2025-01-10

## 2025-01-10 RX ORDER — LEVOTHYROXINE SODIUM 25 UG/1
25 TABLET ORAL DAILY
Qty: 90 TABLET | Refills: 1 | OUTPATIENT
Start: 2025-01-10

## 2025-01-10 NOTE — TELEPHONE ENCOUNTER
Spoke with patient and she stated she spoke with the pharmacy today and they stated they never received script last month. Resent script to pharmacy

## 2025-01-10 NOTE — TELEPHONE ENCOUNTER
Patient stated she spoke with pharmacy and they told her they didn't receive the prescription. Resent medication to pharmacy

## 2025-01-10 NOTE — TELEPHONE ENCOUNTER
"Caller: Thierry Merari CIERRA Melgar \"Radha\"    Relationship: Self    Best call back number: [phone]     Requested Prescriptions:   Requested Prescriptions     Pending Prescriptions Disp Refills    levothyroxine (SYNTHROID, LEVOTHROID) 25 MCG tablet 90 tablet 1     Sig: Take 1 tablet by mouth Daily.        Pharmacy where request should be sent: [facility] - [place], [place] - [address] - [phone]  - [phone] FX     Last office visit with prescribing clinician: 12/6/2024   Last telemedicine visit with prescribing clinician: Visit date not found   Next office visit with prescribing clinician: 1/7/2026     Does the patient have less than a 3 day supply:  [x] Yes  [] No    Would you like a call back once the refill request has been completed: [] Yes [] No    If the office needs to give you a call back, can they leave a voicemail: [] Yes [] No    Cassandra Acuna Rep   01/10/25 09:16 EST         "

## 2025-01-10 NOTE — TELEPHONE ENCOUNTER
"Caller: Merari Guadarrama \"Radha\"    Relationship: Self    Best call back number: [phone]     Requested Prescriptions:   Requested Prescriptions     Pending Prescriptions Disp Refills    triamcinolone (KENALOG) 0.1 % cream 60 g 1     Sig: Apply  topically to the appropriate area as directed 2 (Two) Times a Day.    Progesterone powder 25 g 11     Sig: Progesterone 2.5 % Cream Apply 0.4 ml BID on calendar days 1- 29. Titrate as indicated.    estradiol micronized powder 25 g 11     Sig: Biest 80/20- 0.25 mg/ 0.5 ml cream Apply 0.8 ml 4 lines twice daily on calendar days 1- 29. Titrate as indicated.        Pharmacy where request should be sent: [facility] - [address] - [phone]  - [phone] FX     Last office visit with prescribing clinician: 12/6/2024   Last telemedicine visit with prescribing clinician: Visit date not found   Next office visit with prescribing clinician: 1/7/2026     Does the patient have less than a 3 day supply:  [x] Yes  [] No    Would you like a call back once the refill request has been completed: [] Yes [] No    If the office needs to give you a call back, can they leave a voicemail: [] Yes [] No    Cassandra Acuna Rep   01/10/25 09:18 EST       "

## 2025-01-16 ENCOUNTER — TELEPHONE (OUTPATIENT)
Dept: INTERNAL MEDICINE | Facility: CLINIC | Age: 65
End: 2025-01-16

## 2025-01-16 DIAGNOSIS — M54.9 MID BACK PAIN ON RIGHT SIDE: Primary | ICD-10-CM

## 2025-01-16 RX ORDER — BACLOFEN 10 MG/1
5-20 TABLET ORAL NIGHTLY
Qty: 60 TABLET | Refills: 5 | Status: SHIPPED | OUTPATIENT
Start: 2025-01-16

## 2025-01-16 NOTE — TELEPHONE ENCOUNTER
"  Caller: Merari Guadarrama \"Radha\"    Relationship: Self    Best call back number:      Requested Prescriptions:   BACLOFEN DIDN'T SEE ON HER LIST       Pharmacy where request should be sent: [facility] - [place], [place] - [address] - [phone] PH - [phone] FX     Last office visit with prescribing clinician: 12/6/2024   Last telemedicine visit with prescribing clinician: Visit date not found   Next office visit with prescribing clinician: 1/7/2026     Additional details provided by patient: PATIENT IS OUT OF MEDICATION    Does the patient have less than a 3 day supply:  [x] Yes  [] No      Cassandra Penn Rep   01/16/25 09:19 EST         "

## 2025-01-16 NOTE — TELEPHONE ENCOUNTER
Medication discontinued on 12/6/2024. I looked at chart note dated 12/6/2024 and did not see a reason for the discontinuation. Does patient need to stop medication? Or can she get a refill?

## 2025-02-17 ENCOUNTER — TRANSCRIBE ORDERS (OUTPATIENT)
Dept: ADMINISTRATIVE | Facility: HOSPITAL | Age: 65
End: 2025-02-17
Payer: COMMERCIAL

## 2025-02-17 DIAGNOSIS — Z12.31 SCREENING MAMMOGRAM FOR BREAST CANCER: Primary | ICD-10-CM

## 2025-03-04 LAB
NCCN CRITERIA FLAG: NORMAL
TYRER CUZICK SCORE: 9.1

## 2025-03-05 ENCOUNTER — HOSPITAL ENCOUNTER (OUTPATIENT)
Dept: MAMMOGRAPHY | Facility: HOSPITAL | Age: 65
Discharge: HOME | End: 2025-03-05
Admitting: INTERNAL MEDICINE
Payer: COMMERCIAL

## 2025-03-05 DIAGNOSIS — Z12.31 SCREENING MAMMOGRAM FOR BREAST CANCER: ICD-10-CM

## 2025-03-05 PROCEDURE — 77067 SCR MAMMO BI INCL CAD: CPT

## 2025-03-05 PROCEDURE — 77063 BREAST TOMOSYNTHESIS BI: CPT

## 2025-08-08 DIAGNOSIS — E03.8 OTHER SPECIFIED HYPOTHYROIDISM: ICD-10-CM

## 2025-08-08 RX ORDER — TRIAMCINOLONE ACETONIDE 1 MG/G
CREAM TOPICAL 2 TIMES DAILY
Qty: 60 G | Refills: 1 | Status: SHIPPED | OUTPATIENT
Start: 2025-08-08

## 2025-08-08 RX ORDER — LEVOTHYROXINE SODIUM 25 UG/1
25 TABLET ORAL DAILY
Qty: 90 TABLET | Refills: 1 | Status: SHIPPED | OUTPATIENT
Start: 2025-08-08 | End: 2025-08-08 | Stop reason: SDUPTHER

## 2025-08-08 RX ORDER — LEVOTHYROXINE SODIUM 25 UG/1
25 TABLET ORAL DAILY
Qty: 90 TABLET | Refills: 1 | Status: SHIPPED | OUTPATIENT
Start: 2025-08-08

## 2025-08-08 RX ORDER — TRIAMCINOLONE ACETONIDE 1 MG/G
CREAM TOPICAL 2 TIMES DAILY
Qty: 60 G | Refills: 1 | Status: SHIPPED | OUTPATIENT
Start: 2025-08-08 | End: 2025-08-08 | Stop reason: SDUPTHER